# Patient Record
Sex: FEMALE | Race: BLACK OR AFRICAN AMERICAN | NOT HISPANIC OR LATINO | URBAN - METROPOLITAN AREA
[De-identification: names, ages, dates, MRNs, and addresses within clinical notes are randomized per-mention and may not be internally consistent; named-entity substitution may affect disease eponyms.]

---

## 2021-12-08 ENCOUNTER — OFFICE (OUTPATIENT)
Dept: URBAN - METROPOLITAN AREA CLINIC 19 | Facility: CLINIC | Age: 41
End: 2021-12-08

## 2021-12-08 VITALS
HEIGHT: 66 IN | OXYGEN SATURATION: 99 % | WEIGHT: 186 LBS | DIASTOLIC BLOOD PRESSURE: 79 MMHG | HEART RATE: 84 BPM | SYSTOLIC BLOOD PRESSURE: 125 MMHG

## 2021-12-08 DIAGNOSIS — R12 HEARTBURN: ICD-10-CM

## 2021-12-08 DIAGNOSIS — R10.9 UNSPECIFIED ABDOMINAL PAIN: ICD-10-CM

## 2021-12-08 PROCEDURE — 99204 OFFICE O/P NEW MOD 45 MIN: CPT

## 2021-12-08 RX ORDER — PANTOPRAZOLE SODIUM 40 MG/1
40 TABLET, DELAYED RELEASE ORAL
Qty: 30 | Refills: 11 | Status: ACTIVE
Start: 2021-12-08

## 2021-12-08 RX ORDER — METHYLPREDNISOLONE 4 MG/1
TABLET ORAL
Qty: 1 | Refills: 0 | Status: COMPLETED
Start: 2021-12-08 | End: 2022-01-19

## 2021-12-08 NOTE — SERVICENOTES
The patient's assessment was reviewed with Dr. Canseco and a collaborative plan of care was established.

## 2021-12-08 NOTE — SERVICEHPINOTES
41-year-old  black female here for complaints of intermittent right upper quadrant pain and heartburn.  She has had the symptoms for the last several months.  In 2018 she had a laparoscopic cholecystectomy for gallstones.  Symptoms started a few months ago and were reminiscent of symptoms she had prior to having her gallbladder removed.  Initially, it felt that her symptoms were worse after she would eat fatty foods.  In July her symptoms became severe and she presented to the ED at [facility] [place] on 7/29/21 where routine lab work was normal and CT abdomen /pelvis was unremarkable.  Symptoms have continued to persist intermittently.  They seem to be worse when she is sitting in the car driving, especially for longer periods of time.  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  She has been taking Aleve daily for her symptoms, but denies any significant change.  She does have some heartburn and reflux for which she takes Pepcid from over-the-counter.  She denies dysphagia, change in bowel habit or overt GI bleeding.  Family history is negative for colon neoplasm.

## 2022-01-19 ENCOUNTER — OFFICE (OUTPATIENT)
Dept: URBAN - METROPOLITAN AREA CLINIC 19 | Facility: CLINIC | Age: 42
End: 2022-01-19

## 2022-01-19 VITALS
DIASTOLIC BLOOD PRESSURE: 80 MMHG | DIASTOLIC BLOOD PRESSURE: 78 MMHG | WEIGHT: 186 LBS | SYSTOLIC BLOOD PRESSURE: 136 MMHG | OXYGEN SATURATION: 98 % | HEIGHT: 66 IN | SYSTOLIC BLOOD PRESSURE: 140 MMHG | HEART RATE: 79 BPM

## 2022-01-19 DIAGNOSIS — R12 HEARTBURN: ICD-10-CM

## 2022-01-19 PROCEDURE — 99213 OFFICE O/P EST LOW 20 MIN: CPT

## 2022-01-19 NOTE — SERVICEHPINOTES
41-year-old  black female returns for routine follow-up of her heartburn and reflux.
deysi Scruggs initially saw her on 12/8/21 for complaints of intermittent right upper quadrant pain and heartburn.  She had had these symptoms for the last several months.  In 2018 she had a laparoscopic cholecystectomy for gallstones.  Symptoms started a few prior to me seeing her and were reminiscent of symptoms she had prior to having her gallbladder removed.  Initially, it felt that her symptoms were worse after she would eat fatty foods.  In July her symptoms became severe and she went to the ED at [facility] [place] on 7/29/21 where routine lab work was normal and CT abdomen /pelvis was unremarkable.  Symptoms continued to persist intermittently.  They seem to be worse when she is sitting in the car driving, especially for longer periods of time.  She was having heartburn and reflux and taking Pepcid from over-the-counter.  I gave her prescription of pantoprazole 40 mg daily for her reflux.  I also gave her a Medrol Dosepak for costochondritis to her right side.
br
deysi 
  She returns today for routine follow-up of her heartburn and reflux.  Pantoprazole did seemed to improve her heartburn or reflux, but she does have breakthrough symptoms on occasion.  She does attribute a lot of this to her diet and knows that she needs to avoid acidic foods.  The Medrol Dosepak significantly improved her symptoms of costochondritis.  She does take Aleve fairly regularly for pain, especially if she is sitting for longer periods of time at work.  She denies dysphagia, nausea, vomiting, change in bowel habit or overt GI bleeding.
deysi jo  Family history is negative for colon neoplasm.

## 2022-04-12 ENCOUNTER — AMBULATORY SURGICAL CENTER (OUTPATIENT)
Dept: URBAN - METROPOLITAN AREA SURGERY 3 | Facility: SURGERY | Age: 42
End: 2022-04-12

## 2022-04-12 ENCOUNTER — OFFICE (OUTPATIENT)
Dept: URBAN - METROPOLITAN AREA PATHOLOGY 22 | Facility: PATHOLOGY | Age: 42
End: 2022-04-12

## 2022-04-12 VITALS
TEMPERATURE: 97.1 F | DIASTOLIC BLOOD PRESSURE: 77 MMHG | TEMPERATURE: 97.3 F | RESPIRATION RATE: 16 BRPM | SYSTOLIC BLOOD PRESSURE: 143 MMHG | SYSTOLIC BLOOD PRESSURE: 133 MMHG | HEART RATE: 89 BPM | OXYGEN SATURATION: 97 % | SYSTOLIC BLOOD PRESSURE: 123 MMHG | OXYGEN SATURATION: 98 % | SYSTOLIC BLOOD PRESSURE: 133 MMHG | HEART RATE: 89 BPM | RESPIRATION RATE: 18 BRPM | OXYGEN SATURATION: 99 % | SYSTOLIC BLOOD PRESSURE: 133 MMHG | HEART RATE: 92 BPM | HEIGHT: 66 IN | HEIGHT: 66 IN | DIASTOLIC BLOOD PRESSURE: 59 MMHG | OXYGEN SATURATION: 98 % | SYSTOLIC BLOOD PRESSURE: 118 MMHG | OXYGEN SATURATION: 97 % | RESPIRATION RATE: 20 BRPM | HEART RATE: 88 BPM | DIASTOLIC BLOOD PRESSURE: 77 MMHG | HEIGHT: 66 IN | HEART RATE: 79 BPM | WEIGHT: 190 LBS | SYSTOLIC BLOOD PRESSURE: 123 MMHG | SYSTOLIC BLOOD PRESSURE: 123 MMHG | SYSTOLIC BLOOD PRESSURE: 143 MMHG | OXYGEN SATURATION: 99 % | TEMPERATURE: 97.3 F | OXYGEN SATURATION: 98 % | RESPIRATION RATE: 20 BRPM | HEART RATE: 97 BPM | OXYGEN SATURATION: 99 % | DIASTOLIC BLOOD PRESSURE: 77 MMHG | RESPIRATION RATE: 18 BRPM | SYSTOLIC BLOOD PRESSURE: 118 MMHG | WEIGHT: 190 LBS | TEMPERATURE: 97.3 F | DIASTOLIC BLOOD PRESSURE: 76 MMHG | TEMPERATURE: 97.1 F | RESPIRATION RATE: 16 BRPM | HEART RATE: 79 BPM | SYSTOLIC BLOOD PRESSURE: 128 MMHG | DIASTOLIC BLOOD PRESSURE: 76 MMHG | HEART RATE: 88 BPM | RESPIRATION RATE: 18 BRPM | SYSTOLIC BLOOD PRESSURE: 128 MMHG | SYSTOLIC BLOOD PRESSURE: 143 MMHG | SYSTOLIC BLOOD PRESSURE: 128 MMHG | OXYGEN SATURATION: 97 % | HEART RATE: 92 BPM | HEART RATE: 92 BPM | DIASTOLIC BLOOD PRESSURE: 76 MMHG | SYSTOLIC BLOOD PRESSURE: 118 MMHG | WEIGHT: 190 LBS | RESPIRATION RATE: 20 BRPM | DIASTOLIC BLOOD PRESSURE: 59 MMHG | HEART RATE: 88 BPM | HEART RATE: 89 BPM | TEMPERATURE: 97.1 F | DIASTOLIC BLOOD PRESSURE: 59 MMHG | HEART RATE: 97 BPM | HEART RATE: 79 BPM | HEART RATE: 97 BPM | RESPIRATION RATE: 16 BRPM

## 2022-04-12 DIAGNOSIS — K29.30 CHRONIC SUPERFICIAL GASTRITIS WITHOUT BLEEDING: ICD-10-CM

## 2022-04-12 DIAGNOSIS — K21.9 GASTRO-ESOPHAGEAL REFLUX DISEASE WITHOUT ESOPHAGITIS: ICD-10-CM

## 2022-04-12 PROBLEM — K31.89 OTHER DISEASES OF STOMACH AND DUODENUM: Status: ACTIVE | Noted: 2022-04-12

## 2022-04-12 PROCEDURE — 88313 SPECIAL STAINS GROUP 2: CPT | Performed by: PATHOLOGY

## 2022-04-12 PROCEDURE — 43239 EGD BIOPSY SINGLE/MULTIPLE: CPT | Performed by: INTERNAL MEDICINE

## 2022-04-12 PROCEDURE — 88305 TISSUE EXAM BY PATHOLOGIST: CPT | Performed by: PATHOLOGY

## 2022-04-12 PROCEDURE — 88342 IMHCHEM/IMCYTCHM 1ST ANTB: CPT | Performed by: PATHOLOGY
